# Patient Record
Sex: FEMALE | Race: AMERICAN INDIAN OR ALASKA NATIVE
[De-identification: names, ages, dates, MRNs, and addresses within clinical notes are randomized per-mention and may not be internally consistent; named-entity substitution may affect disease eponyms.]

---

## 2020-05-01 ENCOUNTER — HOSPITAL ENCOUNTER (EMERGENCY)
Dept: HOSPITAL 5 - ED | Age: 40
Discharge: HOME | End: 2020-05-01
Payer: COMMERCIAL

## 2020-05-01 VITALS — DIASTOLIC BLOOD PRESSURE: 60 MMHG | SYSTOLIC BLOOD PRESSURE: 115 MMHG
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
## 2020-05-01 DIAGNOSIS — M25.572: ICD-10-CM
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
## 2020-05-01 DIAGNOSIS — M79.672: ICD-10-CM
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
## 2020-05-01 DIAGNOSIS — S46.912A: Primary | ICD-10-CM

## 2020-05-01 PROCEDURE — 99283 EMERGENCY DEPT VISIT LOW MDM: CPT
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
## 2020-05-01 NOTE — XRAY REPORT
Left shoulder 3 views



INDICATION: Left shoulder pain. Recent injury.



IMPRESSION: No fracture or subluxation is identified.



Signer Name: Domenic Dietz MD 

Signed: 5/1/2020 7:23 PM

Workstation Name: [id number]

## 2020-05-01 NOTE — XRAY REPORT
Left foot 3 views



INDICATION: Left foot pain.



IMPRESSION: No fracture or subluxation of the left foot is identified.



Signer Name: Domenic Dietz MD 

Signed: 5/1/2020 7:24 PM

Workstation Name: [id number]

## 2020-05-01 NOTE — XRAY REPORT
Left ankle 3 views



INDICATION: Left ankle pain following injury



IMPRESSION: No fracture or subluxation of the left ankle.



Signer Name: Domenic Dietz MD 

Signed: 5/1/2020 7:23 PM

Workstation Name: [id number]